# Patient Record
Sex: FEMALE | Race: WHITE
[De-identification: names, ages, dates, MRNs, and addresses within clinical notes are randomized per-mention and may not be internally consistent; named-entity substitution may affect disease eponyms.]

---

## 2021-11-19 ENCOUNTER — HOSPITAL ENCOUNTER (OUTPATIENT)
Dept: HOSPITAL 47 - RADUSWWP | Age: 36
End: 2021-11-19
Attending: STUDENT IN AN ORGANIZED HEALTH CARE EDUCATION/TRAINING PROGRAM
Payer: MEDICAID

## 2021-11-19 DIAGNOSIS — R10.11: Primary | ICD-10-CM

## 2021-11-19 PROCEDURE — 76700 US EXAM ABDOM COMPLETE: CPT

## 2021-11-19 NOTE — US
EXAMINATION TYPE: US abdomen complete

 

DATE OF EXAM: 11/19/2021

 

COMPARISON: NONE

 

CLINICAL HISTORY: 36-year-old female R10.11 RUQ PAIN.

 

TECHNIQUE: Multiple sonographic images of the abdomen are obtained.

 

FINDINGS:

 

EXAM MEASUREMENTS:

 

Liver Length:  13.2 cm   

Gallbladder Wall:  0.2 cm   

CBD:  0.4 cm

Spleen:  11.3 cm   

Right Kidney:  11.0x5.4x3.5 cm 

Left Kidney:  12.0x5.2x4.8 cm   

 

 

 

Pancreas:  wnl

Liver:  wnl  

Gallbladder:  wnl

**Evidence for sonographic Cárdenas's sign:  No

CBD:  wnl 

Spleen:  wnl   

Right Kidney:  wnl   

Left Kidney:  wnl   

Upper IVC:  wnl  

Abd Aorta:  wnl

 

 

 

IMPRESSION:

Unremarkable sonographic examination of the abdomen.